# Patient Record
Sex: FEMALE | Race: WHITE | Employment: OTHER | URBAN - METROPOLITAN AREA
[De-identification: names, ages, dates, MRNs, and addresses within clinical notes are randomized per-mention and may not be internally consistent; named-entity substitution may affect disease eponyms.]

---

## 2017-02-14 ENCOUNTER — OFFICE VISIT (OUTPATIENT)
Dept: FAMILY MEDICINE CLINIC | Age: 47
End: 2017-02-14

## 2017-02-14 VITALS
HEART RATE: 83 BPM | WEIGHT: 121.8 LBS | TEMPERATURE: 97.3 F | OXYGEN SATURATION: 98 % | BODY MASS INDEX: 21.58 KG/M2 | RESPIRATION RATE: 18 BRPM | DIASTOLIC BLOOD PRESSURE: 80 MMHG | HEIGHT: 63 IN | SYSTOLIC BLOOD PRESSURE: 124 MMHG

## 2017-02-14 DIAGNOSIS — R10.30 LOWER ABDOMINAL PAIN: Primary | ICD-10-CM

## 2017-02-14 RX ORDER — AMOXICILLIN 875 MG/1
TABLET, FILM COATED ORAL
Refills: 0 | COMMUNITY
Start: 2017-02-11 | End: 2018-06-22 | Stop reason: ALTCHOICE

## 2017-02-14 RX ORDER — CLARITHROMYCIN 500 MG/1
TABLET, FILM COATED, EXTENDED RELEASE ORAL
COMMUNITY
Start: 2017-02-05 | End: 2017-11-07

## 2017-02-14 RX ORDER — VEHICLE BASE NO.24
CREAM (GRAM) MISCELLANEOUS
COMMUNITY
Start: 2017-01-09 | End: 2019-04-17 | Stop reason: ALTCHOICE

## 2017-02-14 RX ORDER — MONTELUKAST SODIUM 10 MG/1
TABLET ORAL
Refills: 1 | COMMUNITY
Start: 2017-02-05 | End: 2019-04-17 | Stop reason: ALTCHOICE

## 2017-02-14 NOTE — PROGRESS NOTES
Chief Complaint   Patient presents with    Sinus Infection     Bisen     Burn     Having burn in [address]     1. Have you been to the ER, urgent care clinic since your last visit? Hospitalized since your last visit? No    2. Have you seen or consulted any other health care providers outside of the [address] [place] [address] since your last visit? Include any pap smears or colon screening.  Yes,  Allergist 12/16

## 2017-02-14 NOTE — MR AVS SNAPSHOT
Visit Information Date & Time Provider Department Dept. Phone Encounter #  
 2/14/2017  2:45 PM Yvette Burrows, [address] [phone] [id number] Your Appointments 2/17/2017  2:30 PM  
New Patient with MD Joan Tsai TURNER, [address] ([address]) Appt Note: new pt   go over medical history [address] [facility] [employer] 7 [id number]  
[phone]  
  
   
 [address] [facility] [address] Upcoming Health Maintenance Date Due DTaP/Tdap/Td series (1 - Tdap) 5/7/1989 PAP AKA CERVICAL CYTOLOGY 4/9/2017 Allergies as of 2/14/2017  Review Complete On: 2/14/2017 By: Yvette Burrows NP No Known Allergies Current Immunizations  Never Reviewed No immunizations on file. Not reviewed this visit Vitals BP Pulse Temp Resp Height(growth percentile) Weight(growth percentile) 124/80 (BP 1 Location: Right arm, BP Patient Position: Sitting) 83 97.3 °F (36.3 °C) (Oral) 18 5' 3\" (1.6 m) 121 lb 12.8 oz (55.2 kg) SpO2 BMI OB Status Smoking Status 98% 21.58 kg/m2 Hysterectomy Never Smoker Vitals History BMI and BSA Data Body Mass Index Body Surface Area  
 21.58 kg/m 2 1.57 m 2 Preferred Pharmacy Pharmacy Name Phone [address] [facility], [address] [place] Your Updated Medication List  
  
   
This list is accurate as of: 2/14/17  3:17 PM.  Always use your most recent med list. ALLEGRA 180 mg tablet Generic drug:  fexofenadine Take  by mouth daily. amoxicillin 875 mg tablet Commonly known as:  AMOXIL  
take 1 tablet by mouth twice a day for 10 days  
  
 citalopram 10 mg tablet Commonly known as:  CeleXA  
take 1 tablet by mouth once daily  
  
 clarithromycin  mg SR tablet Commonly known as:  BIAXIN XL  
  
 FLONASE 50 mcg/actuation nasal spray Generic drug:  fluticasone  
daily. MAGNESIUM PO Take  by mouth.  
  
 montelukast 10 mg tablet Commonly known as:  SINGULAIR  
take 1 tablet by mouth once daily PEPCID AC 10 mg tablet Generic drug:  famotidine Take 10 mg by mouth daily. Mat Negron Generic drug:  [employer] (Bulk) Introducing [facility] SERVICES! Dear Charles Ovalle: Thank you for requesting a [employer] account. Our records indicate that you already have an active [employer] account. You can access your account anytime at [email or web address] Did you know that you can access your hospital and ER discharge instructions at any time in [employer]? You can also review all of your test results from your hospital stay or ER visit. Additional Information If you have questions, please visit the Frequently Asked Questions section of the [employer] website at [email or web address] Remember, [employer] is NOT to be used for urgent needs. For medical emergencies, dial 911. Now available from your iPhone and Android! Please provide this summary of care documentation to your next provider. Your primary care clinician is listed as [address] [address], [facility]/s . If you have any questions after today's visit, please call [phone].

## 2017-02-14 NOTE — PROGRESS NOTES
HISTORY OF PRESENT ILLNESS  Isaiah Mcmillan is a 48 y.o. female. HPI: Pt reports intermittent lower abdominal discomfort with burning for several years. Last month she had sinus infection and went to an urgent care clinic. She was given Biaxin for sinus infection, after taking Biaxin not only her sinus infection got better, her lower abdominal pain also has improved. This made her to believe that she must have had an ulcer that taking Bixain has improved her abdomina pain. She also admits that she is constipated most of the time. She eats eggs for breakfast, for lunch yogurt, peanut butter sandwich and dinner she eats chicken and fish. She doesn't eat much vegetables or fruits. She is worried they will cause abdominal pain. Past Medical History   Diagnosis Date    Allergic rhinitis, seasonal     Irritable bowel syndrome with constipation     Postmenopausal hyperhidrosis      Past Surgical History   Procedure Laterality Date    Hx total abdominal hysterectomy  2002     w/BSPO    Hx colonoscopy  2012     normal    No Known Allergies    Current Outpatient Prescriptions:     MAGNESIUM PO, Take  by mouth., Disp: , Rfl:     fluticasone (FLONASE) 50 mcg/actuation nasal spray, daily. , Disp: , Rfl:     citalopram (CELEXA) 10 mg tablet, take 1 tablet by mouth once daily (Patient taking differently: every fourty-eight (48) hours. take 1 tablet by mouth once daily), Disp: 90 tablet, Rfl: 3    fexofenadine (ALLEGRA) 180 mg tablet, Take  by mouth daily. , Disp: , Rfl:     famotidine (PEPCID AC) 10 mg tablet, Take 10 mg by mouth daily. , Disp: , Rfl:     amoxicillin (AMOXIL) 875 mg tablet, take 1 tablet by mouth twice a day for 10 days, Disp: , Rfl: 0    clarithromycin XL (BIAXIN XL) 500 mg SR tablet, , Disp: , Rfl:     montelukast (SINGULAIR) 10 mg tablet, take 1 tablet by mouth once daily, Disp: , Rfl: 1    VERSABASEA crea, , Disp: , Rfl:   Review of Systems   Constitutional: Negative. Respiratory: Negative. Cardiovascular: Negative. Gastrointestinal: Positive for abdominal pain and constipation. Negative for blood in stool, diarrhea, heartburn, melena, nausea and vomiting. Blood pressure 124/80, pulse 83, temperature 97.3 °F (36.3 °C), temperature source Oral, resp. rate 18, height 5' 3\" (1.6 m), weight 121 lb 12.8 oz (55.2 kg), SpO2 98 %. Physical Exam   Constitutional: No distress. HENT:   Mouth/Throat: Oropharynx is clear and moist.   Neck: Neck supple. Cardiovascular: Normal rate and regular rhythm. No murmur heard. Pulmonary/Chest: Effort normal and breath sounds normal.   Abdominal: Soft. Bowel sounds are normal.   Nursing note and vitals reviewed.       ASSESSMENT and PLAN    ICD-10-CM ICD-9-CM    1. Lower abdominal pain R10.30 789.09 CBC WITH AUTOMATED DIFF      METABOLIC PANEL, COMPREHENSIVE      H. PYLORI ABS, IGG, IGA, IGM      URINALYSIS W/ RFLX MICROSCOPIC   await labs  Advised pt she needs to incorporate vegetables and fruits in her diet to help with her constipation, if she feels bloated she can add  Tamiko to Cinnamon to her drinks or food as they prevent her feeling bloated after eating vegetables  Pt was given an after visit summary which includes diagnosis, current medicines and vital and voiced understanding of treatment plan

## 2017-02-15 LAB
APPEARANCE UR: CLEAR
BILIRUB UR QL STRIP: NEGATIVE
COLOR UR: YELLOW
GLUCOSE UR QL: NEGATIVE
HGB UR QL STRIP: NEGATIVE
KETONES UR QL STRIP: NEGATIVE
LEUKOCYTE ESTERASE UR QL STRIP: NEGATIVE
MICRO URNS: NORMAL
NITRITE UR QL STRIP: NEGATIVE
PH UR STRIP: 6 [PH] (ref 5–7.5)
PROT UR QL STRIP: NEGATIVE
SP GR UR: 1.02 (ref 1–1.03)
UROBILINOGEN UR STRIP-MCNC: 0.2 MG/DL (ref 0.2–1)

## 2017-02-16 LAB
ALBUMIN SERPL-MCNC: 4.8 G/DL (ref 3.5–5.5)
ALBUMIN/GLOB SERPL: 1.9 {RATIO} (ref 1.1–2.5)
ALP SERPL-CCNC: 89 IU/L (ref 39–117)
ALT SERPL-CCNC: 24 IU/L (ref 0–32)
AST SERPL-CCNC: 22 IU/L (ref 0–40)
BASOPHILS # BLD AUTO: 0 X10E3/UL (ref 0–0.2)
BASOPHILS NFR BLD AUTO: 1 %
BILIRUB SERPL-MCNC: 0.5 MG/DL (ref 0–1.2)
BUN SERPL-MCNC: 19 MG/DL (ref 6–24)
BUN/CREAT SERPL: 20 (ref 9–23)
CALCIUM SERPL-MCNC: 9.7 MG/DL (ref 8.7–10.2)
CHLORIDE SERPL-SCNC: 99 MMOL/L (ref 96–106)
CO2 SERPL-SCNC: 25 MMOL/L (ref 18–29)
CREAT SERPL-MCNC: 0.93 MG/DL (ref 0.57–1)
EOSINOPHIL # BLD AUTO: 0.1 X10E3/UL (ref 0–0.4)
EOSINOPHIL NFR BLD AUTO: 1 %
ERYTHROCYTE [DISTWIDTH] IN BLOOD BY AUTOMATED COUNT: 12.4 % (ref 12.3–15.4)
GLOBULIN SER CALC-MCNC: 2.5 G/DL (ref 1.5–4.5)
GLUCOSE SERPL-MCNC: 90 MG/DL (ref 65–99)
H PYLORI IGA SER-ACNC: <9 UNITS (ref 0–8.9)
H PYLORI IGG SER IA-ACNC: <0.9 U/ML (ref 0–0.8)
H PYLORI IGM SER-ACNC: <9 UNITS (ref 0–8.9)
HCT VFR BLD AUTO: 47 % (ref 34–46.6)
HGB BLD-MCNC: 15.9 G/DL (ref 11.1–15.9)
IMM GRANULOCYTES # BLD: 0 X10E3/UL (ref 0–0.1)
IMM GRANULOCYTES NFR BLD: 0 %
LYMPHOCYTES # BLD AUTO: 2.2 X10E3/UL (ref 0.7–3.1)
LYMPHOCYTES NFR BLD AUTO: 40 %
MCH RBC QN AUTO: 31.5 PG (ref 26.6–33)
MCHC RBC AUTO-ENTMCNC: 33.8 G/DL (ref 31.5–35.7)
MCV RBC AUTO: 93 FL (ref 79–97)
MONOCYTES # BLD AUTO: 0.3 X10E3/UL (ref 0.1–0.9)
MONOCYTES NFR BLD AUTO: 6 %
NEUTROPHILS # BLD AUTO: 2.8 X10E3/UL (ref 1.4–7)
NEUTROPHILS NFR BLD AUTO: 52 %
PLATELET # BLD AUTO: 252 X10E3/UL (ref 150–379)
POTASSIUM SERPL-SCNC: 4.5 MMOL/L (ref 3.5–5.2)
PROT SERPL-MCNC: 7.3 G/DL (ref 6–8.5)
RBC # BLD AUTO: 5.04 X10E6/UL (ref 3.77–5.28)
SODIUM SERPL-SCNC: 143 MMOL/L (ref 134–144)
WBC # BLD AUTO: 5.4 X10E3/UL (ref 3.4–10.8)

## 2017-04-18 DIAGNOSIS — F41.9 ANXIETY: ICD-10-CM

## 2017-04-19 RX ORDER — CITALOPRAM 10 MG/1
TABLET, FILM COATED ORAL
Qty: 90 TAB | Refills: 1 | Status: SHIPPED | OUTPATIENT
Start: 2017-04-19 | End: 2017-11-01 | Stop reason: SDUPTHER

## 2017-11-01 DIAGNOSIS — F41.9 ANXIETY: ICD-10-CM

## 2017-11-01 NOTE — TELEPHONE ENCOUNTER
Refill request:   Requested Prescriptions     Pending Prescriptions Disp Refills    CELEXA 10 mg tablet 90 Tab 1           Send script to [facility]    Need brand name-

## 2017-11-03 NOTE — TELEPHONE ENCOUNTER
Let her know she cannot take Celexa with Clarithromycin; is she on this? If yes, cut the dose of celexa to 1/2 pill until done then back to whole pill.

## 2017-11-06 DIAGNOSIS — F41.9 ANXIETY: ICD-10-CM

## 2017-11-06 RX ORDER — CITALOPRAM 10 MG/1
TABLET, FILM COATED ORAL
Qty: 90 TAB | Refills: 0 | Status: SHIPPED | OUTPATIENT
Start: 2017-11-06 | End: 2017-11-06 | Stop reason: SDUPTHER

## 2017-11-06 NOTE — TELEPHONE ENCOUNTER
Bud Sane     -     [phone]               Celexa script from [facility]  states brand only - Patient requesting generic  -

## 2017-11-06 NOTE — TELEPHONE ENCOUNTER
Patient is calling to get the generic brand of the Celexa rx to be called into the pharmacy. Pharmacy on file verified.

## 2017-11-07 RX ORDER — CITALOPRAM 10 MG/1
TABLET ORAL
Qty: 90 TAB | Refills: 0 | Status: SHIPPED | OUTPATIENT
Start: 2017-11-07 | End: 2018-05-20 | Stop reason: SDUPTHER

## 2018-04-11 ENCOUNTER — OFFICE VISIT (OUTPATIENT)
Dept: FAMILY MEDICINE CLINIC | Age: 48
End: 2018-04-11

## 2018-04-11 VITALS
TEMPERATURE: 98.2 F | WEIGHT: 121.8 LBS | OXYGEN SATURATION: 96 % | BODY MASS INDEX: 21.58 KG/M2 | HEIGHT: 63 IN | HEART RATE: 76 BPM | SYSTOLIC BLOOD PRESSURE: 116 MMHG | RESPIRATION RATE: 16 BRPM | DIASTOLIC BLOOD PRESSURE: 76 MMHG

## 2018-04-11 DIAGNOSIS — Z00.00 ROUTINE GENERAL MEDICAL EXAMINATION AT A HEALTH CARE FACILITY: Primary | ICD-10-CM

## 2018-04-11 NOTE — PROGRESS NOTES
Subjective:   50 y.o. female for Well Woman Check. Her gyne and breast care is done elsewhere by her Ob-Gyne physician. Patient Active Problem List    Diagnosis Date Noted    Seasonal allergic rhinitis 12/19/2016    Mitral valve prolapse, + click but no murmur; followed by cardiology-self referred; no SBE recommended- Dr. Charity Delong 04/28/2011    Postmenopausal hyperhidrosis     Irritable bowel syndrome with constipation      Current Outpatient Prescriptions   Medication Sig Dispense Refill    citalopram (CELEXA) 10 mg tablet TAKE 1 TABLET BY MOUTH DAILY 90 Tab 0    MAGNESIUM PO Take  by mouth.  fexofenadine (ALLEGRA) 180 mg tablet Take  by mouth daily.  famotidine (PEPCID AC) 10 mg tablet Take 10 mg by mouth daily.  amoxicillin (AMOXIL) 875 mg tablet take 1 tablet by mouth twice a day for 10 days  0    montelukast (SINGULAIR) 10 mg tablet take 1 tablet by mouth once daily  1    VERSABASEA crea       fluticasone (FLONASE) 50 mcg/actuation nasal spray daily. No Known Allergies  Past Medical History:   Diagnosis Date    Allergic rhinitis, seasonal     Irritable bowel syndrome with constipation     Postmenopausal hyperhidrosis      Past Surgical History:   Procedure Laterality Date    HX COLONOSCOPY  2012    normal     HX TOTAL ABDOMINAL HYSTERECTOMY  2002    w/BSPO     Family History   Problem Relation Age of Onset    Cancer Mother      uterine    Depression Mother     Dementia Mother     Diabetes Maternal Grandfather      Social History   Substance Use Topics    Smoking status: Never Smoker    Smokeless tobacco: Never Used    Alcohol use 1.0 - 1.5 oz/week     2 - 3 Standard drinks or equivalent per week          ROS: Feeling generally well. No TIA's or unusual headaches, no dysphagia. No prolonged cough. No dyspnea or chest pain on exertion. No abdominal pain, change in bowel habits, black or bloody stools. No urinary tract symptoms.   No new or unusual musculoskeletal symptoms. Specific concerns today:None    Objective: The patient appears well, alert, oriented x 3, in no distress. Visit Vitals    /76 (BP 1 Location: Left arm, BP Patient Position: Sitting)    Pulse 76    Temp 98.2 °F (36.8 °C) (Oral)    Resp 16    Ht 5' 3\" (1.6 m)    Wt 121 lb 12.8 oz (55.2 kg)    SpO2 96%    BMI 21.58 kg/m2     ENT normal.  Neck supple. No adenopathy or thyromegaly. LONA. Lungs are clear, good air entry, no wheezes, rhonchi or rales. S1 and S2 normal, no murmurs, regular rate and rhythm. Abdomen soft without tenderness, guarding, mass or organomegaly. Extremities show no edema, normal peripheral pulses. Neurological is normal, no focal findings. Breast and Pelvic exams are deferred. Assessment/Plan:   Well Woman  continue present plan    ICD-10-CM ICD-9-CM    1.  Routine general medical examination at a health care facility Z00.00 V70.0 CBC W/O DIFF      METABOLIC PANEL, COMPREHENSIVE      TSH 3RD GENERATION      LIPID PANEL      VITAMIN D, 25 HYDROXY   Pt was given an after visit summary which includes diagnosis, current medicines and vital and voiced understanding of treatment plan

## 2018-04-12 LAB
25(OH)D3+25(OH)D2 SERPL-MCNC: 53.4 NG/ML (ref 30–100)
ALBUMIN SERPL-MCNC: 4.6 G/DL (ref 3.5–5.5)
ALBUMIN/GLOB SERPL: 1.9 {RATIO} (ref 1.2–2.2)
ALP SERPL-CCNC: 79 IU/L (ref 39–117)
ALT SERPL-CCNC: 24 IU/L (ref 0–32)
AST SERPL-CCNC: 23 IU/L (ref 0–40)
BILIRUB SERPL-MCNC: 0.3 MG/DL (ref 0–1.2)
BUN SERPL-MCNC: 25 MG/DL (ref 6–24)
BUN/CREAT SERPL: 32 (ref 9–23)
CALCIUM SERPL-MCNC: 10 MG/DL (ref 8.7–10.2)
CHLORIDE SERPL-SCNC: 100 MMOL/L (ref 96–106)
CHOLEST SERPL-MCNC: 207 MG/DL (ref 100–199)
CO2 SERPL-SCNC: 27 MMOL/L (ref 18–29)
CREAT SERPL-MCNC: 0.79 MG/DL (ref 0.57–1)
ERYTHROCYTE [DISTWIDTH] IN BLOOD BY AUTOMATED COUNT: 12.3 % (ref 12.3–15.4)
GFR SERPLBLD CREATININE-BSD FMLA CKD-EPI: 102 ML/MIN/1.73
GFR SERPLBLD CREATININE-BSD FMLA CKD-EPI: 88 ML/MIN/1.73
GLOBULIN SER CALC-MCNC: 2.4 G/DL (ref 1.5–4.5)
GLUCOSE SERPL-MCNC: 94 MG/DL (ref 65–99)
HCT VFR BLD AUTO: 44.9 % (ref 34–46.6)
HDLC SERPL-MCNC: 81 MG/DL
HGB BLD-MCNC: 15 G/DL (ref 11.1–15.9)
INTERPRETATION, 910389: NORMAL
LDLC SERPL CALC-MCNC: 103 MG/DL (ref 0–99)
MCH RBC QN AUTO: 31.2 PG (ref 26.6–33)
MCHC RBC AUTO-ENTMCNC: 33.4 G/DL (ref 31.5–35.7)
MCV RBC AUTO: 93 FL (ref 79–97)
PLATELET # BLD AUTO: 229 X10E3/UL (ref 150–379)
POTASSIUM SERPL-SCNC: 4.4 MMOL/L (ref 3.5–5.2)
PROT SERPL-MCNC: 7 G/DL (ref 6–8.5)
RBC # BLD AUTO: 4.81 X10E6/UL (ref 3.77–5.28)
SODIUM SERPL-SCNC: 142 MMOL/L (ref 134–144)
TRIGL SERPL-MCNC: 113 MG/DL (ref 0–149)
TSH SERPL DL<=0.005 MIU/L-ACNC: 1.05 UIU/ML (ref 0.45–4.5)
VLDLC SERPL CALC-MCNC: 23 MG/DL (ref 5–40)
WBC # BLD AUTO: 5.4 X10E3/UL (ref 3.4–10.8)

## 2018-05-20 DIAGNOSIS — F41.9 ANXIETY: ICD-10-CM

## 2018-05-21 RX ORDER — CITALOPRAM 10 MG/1
TABLET ORAL
Qty: 90 TAB | Refills: 3 | Status: SHIPPED | OUTPATIENT
Start: 2018-05-21 | End: 2019-07-02 | Stop reason: SDUPTHER

## 2018-06-22 ENCOUNTER — OFFICE VISIT (OUTPATIENT)
Dept: FAMILY MEDICINE CLINIC | Age: 48
End: 2018-06-22

## 2018-06-22 VITALS
RESPIRATION RATE: 18 BRPM | DIASTOLIC BLOOD PRESSURE: 79 MMHG | HEIGHT: 63 IN | HEART RATE: 62 BPM | SYSTOLIC BLOOD PRESSURE: 115 MMHG | WEIGHT: 121.2 LBS | TEMPERATURE: 97.8 F | OXYGEN SATURATION: 99 % | BODY MASS INDEX: 21.48 KG/M2

## 2018-06-22 DIAGNOSIS — J06.9 ACUTE URI: Primary | ICD-10-CM

## 2018-06-22 RX ORDER — AZITHROMYCIN 250 MG/1
TABLET, FILM COATED ORAL
Qty: 6 TAB | Refills: 0 | Status: SHIPPED | OUTPATIENT
Start: 2018-06-22 | End: 2018-06-27

## 2018-06-22 NOTE — PROGRESS NOTES
HISTORY OF PRESENT ILLNESS  Donya Burton is a 46 y.o. female. HPI: Patient complaints of nasal congestion, yellow nasal discharge, facial pain and ears popping x five days. Taking motrin and zyrtec without help. She is going out of town and would like to feel better before leaving  Past Medical History:   Diagnosis Date    Allergic rhinitis, seasonal     Irritable bowel syndrome with constipation     Postmenopausal hyperhidrosis      Past Surgical History:   Procedure Laterality Date    HX COLONOSCOPY  2012    normal     HX TOTAL ABDOMINAL HYSTERECTOMY  2002    w/BSPO     Allergies   Allergen Reactions    Penicillins Rash     Current Outpatient Prescriptions:     azithromycin (ZITHROMAX) 250 mg tablet, Take 2 tablets today, then take 1 tablet daily, Disp: 6 Tab, Rfl: 0    citalopram (CELEXA) 10 mg tablet, take 1 tablet by mouth once daily, Disp: 90 Tab, Rfl: 3    MAGNESIUM PO, Take 400 mg by mouth daily. , Disp: , Rfl:     fexofenadine (ALLEGRA) 180 mg tablet, Take  by mouth daily. , Disp: , Rfl:     famotidine (PEPCID AC) 10 mg tablet, Take 10 mg by mouth daily. , Disp: , Rfl:     montelukast (SINGULAIR) 10 mg tablet, take 1 tablet by mouth once daily, Disp: , Rfl: 1    VERSABASEA crea, , Disp: , Rfl:     fluticasone (FLONASE) 50 mcg/actuation nasal spray, daily. , Disp: , Rfl:   Review of Systems   Constitutional: Negative. HENT: Positive for congestion, sinus pain and sore throat. Respiratory: Negative. Cardiovascular: Negative. Gastrointestinal: Negative. Blood pressure 115/79, pulse 62, temperature 97.8 °F (36.6 °C), temperature source Oral, resp. rate 18, height 5' 3\" (1.6 m), weight 121 lb 3.2 oz (55 kg), SpO2 99 %. Physical Exam   Constitutional: No distress. HENT:   Nasopharyngeal erythema   Maxillary sinus pain   Neck: Neck supple. Cardiovascular: Normal rate and regular rhythm. No murmur heard.   Pulmonary/Chest: Effort normal and breath sounds normal.   Abdominal: Soft. Bowel sounds are normal.   Nursing note and vitals reviewed. ASSESSMENT and PLAN  Diagnoses and all orders for this visit:    1. Acute URI  -     azithromycin (ZITHROMAX) 250 mg tablet;  Take 2 tablets today, then take 1 tablet daily        -     continue with motrin and zyrtec   Pt was given an after visit summary which includes diagnosis, current medicines and vital and voiced understanding of treatment plan

## 2018-06-22 NOTE — MR AVS SNAPSHOT
Irlanda Jensen 
 
 
 [address] 
[phone] Patient: Kaitlin Kumar MRN: [id number] OSQ:[birth year] Visit Information Date & Time Provider Department Dept. Phone Encounter #  
 6/22/2018 11:45 AM Natasha Ortiz, [facility] [phone] [id number] Upcoming Health Maintenance Date Due DTaP/Tdap/Td series (1 - Tdap) 5/7/1989 FOBT Q 1 YEAR AGE 50-75 5/7/2018 Influenza Age 5 to Adult 8/1/2018 PAP AKA CERVICAL CYTOLOGY 6/1/2020 BREAST CANCER SCRN MAMMOGRAM 6/5/2020 Allergies as of 6/22/2018  Review Complete On: 6/22/2018 By: Natasha Ortiz NP Severity Noted Reaction Type Reactions Penicillins High 06/22/2018    Rash Current Immunizations  Never Reviewed No immunizations on file. Not reviewed this visit You Were Diagnosed With   
  
 Codes Comments Acute URI    -  Primary ICD-10-CM: J06.9 ICD-9-CM: 465.9 Vitals BP Pulse Temp Resp Height(growth percentile) Weight(growth percentile) 115/79 (BP 1 Location: Left arm, BP Patient Position: Sitting) 62 97.8 °F (36.6 °C) (Oral) 18 5' 3\" (1.6 m) 121 lb 3.2 oz (55 kg) SpO2 BMI OB Status Smoking Status 99% 21.47 kg/m2 Hysterectomy Never Smoker Vitals History BMI and BSA Data Body Mass Index Body Surface Area  
 21.47 kg/m 2 1.56 m 2 Preferred Pharmacy Pharmacy Name Phone [address] Your Updated Medication List  
  
   
This list is accurate as of 6/22/18 12:24 PM.  Always use your most recent med list. ALLEGRA 180 mg tablet Generic drug:  fexofenadine Take  by mouth daily. azithromycin 250 mg tablet Commonly known as:  Alfa Weld Take 2 tablets today, then take 1 tablet daily  
  
 citalopram 10 mg tablet Commonly known as:  CELEXA  
take 1 tablet by mouth once daily FLONASE 50 mcg/actuation nasal spray Generic drug:  fluticasone  
daily. MAGNESIUM PO Take 400 mg by mouth daily. montelukast 10 mg tablet Commonly known as:  SINGULAIR  
take 1 tablet by mouth once daily PEPCID AC 10 mg tablet Generic drug:  famotidine Take 10 mg by mouth daily. Hui Sickle Generic drug:  [employer] (Bulk) Prescriptions Sent to Pharmacy Refills  
 azithromycin (ZITHROMAX) 250 mg tablet 0 Sig: Take 2 tablets today, then take 1 tablet daily Class: Normal  
 Pharmacy: [id number] [facility], [address] [place] [address] #: [phone] [facility] SERVICES! Dear Gildardo Arzola: Thank you for requesting a [employer] account. Our records indicate that you already have an active [employer]ongo account. You can access your account anytime at [email or web address] Did you know that you can access your hospital and ER discharge instructions at any time in Zipongo? You can also review all of your test results from your hospital stay or ER visit. Additional Information If you have questions, please visit the Frequently Asked Questions section of the [employer] website at [email or web address] Remember, Zipongo is NOT to be used for urgent needs. For medical emergencies, dial 911. Now available from your iPhone and Android! Please provide this summary of care documentation to your next provider. Your primary care clinician is listed as [address] [address], [facility]/s . If you have any questions after today's visit, please call [phone].

## 2018-06-22 NOTE — PROGRESS NOTES
Chief Complaint   Patient presents with    Sinus Infection     sore throat, pressure around eyes x 5 days     1. Have you been to the ER, urgent care clinic since your last visit? Hospitalized since your last visit? No    2. Have you seen or consulted any other health care providers outside of the [address] [place] [address] since your last visit? Include any pap smears or colon screening.  No

## 2019-04-16 ENCOUNTER — OFFICE VISIT (OUTPATIENT)
Dept: FAMILY MEDICINE CLINIC | Age: 49
End: 2019-04-16

## 2019-04-16 DIAGNOSIS — Z12.11 COLON CANCER SCREENING: ICD-10-CM

## 2019-04-16 DIAGNOSIS — Z23 NEED FOR TETANUS, DIPHTHERIA, AND ACELLULAR PERTUSSIS (TDAP) VACCINE: ICD-10-CM

## 2019-04-16 DIAGNOSIS — J30.2 SEASONAL ALLERGIC RHINITIS, UNSPECIFIED TRIGGER: ICD-10-CM

## 2019-04-16 DIAGNOSIS — Z83.3 FAMILY HISTORY OF DIABETES MELLITUS: ICD-10-CM

## 2019-04-16 DIAGNOSIS — F41.9 ANXIETY: ICD-10-CM

## 2019-04-16 DIAGNOSIS — Z00.00 PHYSICAL EXAM: Primary | ICD-10-CM

## 2019-04-16 DIAGNOSIS — I34.1 MITRAL VALVE PROLAPSE: ICD-10-CM

## 2019-04-16 DIAGNOSIS — E55.9 VITAMIN D DEFICIENCY: ICD-10-CM

## 2019-04-16 DIAGNOSIS — Z82.62 FAMILY HISTORY OF OSTEOPOROSIS IN MOTHER: ICD-10-CM

## 2019-04-16 NOTE — PROGRESS NOTES
Chief Complaint   Patient presents with    Complete Physical     1. Have you been to the ER, urgent care clinic since your last visit? Hospitalized since your last visit? No    2. Have you seen or consulted any other health care providers outside of the [address] [place] [address] since your last visit? Include any pap smears or colon screening.  No

## 2019-04-17 VITALS
RESPIRATION RATE: 18 BRPM | TEMPERATURE: 97.8 F | HEIGHT: 63 IN | WEIGHT: 119.6 LBS | SYSTOLIC BLOOD PRESSURE: 105 MMHG | BODY MASS INDEX: 21.19 KG/M2 | OXYGEN SATURATION: 97 % | HEART RATE: 73 BPM | DIASTOLIC BLOOD PRESSURE: 73 MMHG

## 2019-04-17 LAB
25(OH)D3+25(OH)D2 SERPL-MCNC: 59 NG/ML (ref 30–100)
ALBUMIN SERPL-MCNC: 4.6 G/DL (ref 3.5–5.5)
ALBUMIN/GLOB SERPL: 2 {RATIO} (ref 1.2–2.2)
ALP SERPL-CCNC: 77 IU/L (ref 39–117)
ALT SERPL-CCNC: 25 IU/L (ref 0–32)
APPEARANCE UR: CLEAR
AST SERPL-CCNC: 21 IU/L (ref 0–40)
BILIRUB SERPL-MCNC: 0.3 MG/DL (ref 0–1.2)
BILIRUB UR QL STRIP: NEGATIVE
BUN SERPL-MCNC: 28 MG/DL (ref 6–24)
BUN/CREAT SERPL: 30 (ref 9–23)
CALCIUM SERPL-MCNC: 10 MG/DL (ref 8.7–10.2)
CHLORIDE SERPL-SCNC: 101 MMOL/L (ref 96–106)
CHOLEST SERPL-MCNC: 215 MG/DL (ref 100–199)
CO2 SERPL-SCNC: 26 MMOL/L (ref 20–29)
COLOR UR: YELLOW
CREAT SERPL-MCNC: 0.92 MG/DL (ref 0.57–1)
ERYTHROCYTE [DISTWIDTH] IN BLOOD BY AUTOMATED COUNT: 12.7 % (ref 12.3–15.4)
GLOBULIN SER CALC-MCNC: 2.3 G/DL (ref 1.5–4.5)
GLUCOSE SERPL-MCNC: 102 MG/DL (ref 65–99)
GLUCOSE UR QL: NEGATIVE
HCT VFR BLD AUTO: 46.7 % (ref 34–46.6)
HDLC SERPL-MCNC: 78 MG/DL
HGB BLD-MCNC: 15.3 G/DL (ref 11.1–15.9)
HGB UR QL STRIP: NEGATIVE
INTERPRETATION, 910389: NORMAL
KETONES UR QL STRIP: NEGATIVE
LDLC SERPL CALC-MCNC: 123 MG/DL (ref 0–99)
LEUKOCYTE ESTERASE UR QL STRIP: NEGATIVE
MCH RBC QN AUTO: 31 PG (ref 26.6–33)
MCHC RBC AUTO-ENTMCNC: 32.8 G/DL (ref 31.5–35.7)
MCV RBC AUTO: 95 FL (ref 79–97)
MICRO URNS: NORMAL
NITRITE UR QL STRIP: NEGATIVE
PH UR STRIP: 6 [PH] (ref 5–7.5)
PLATELET # BLD AUTO: 221 X10E3/UL (ref 150–379)
POTASSIUM SERPL-SCNC: 3.8 MMOL/L (ref 3.5–5.2)
PROT SERPL-MCNC: 6.9 G/DL (ref 6–8.5)
PROT UR QL STRIP: NEGATIVE
RBC # BLD AUTO: 4.93 X10E6/UL (ref 3.77–5.28)
SODIUM SERPL-SCNC: 141 MMOL/L (ref 134–144)
SP GR UR: 1.01 (ref 1–1.03)
TRIGL SERPL-MCNC: 72 MG/DL (ref 0–149)
UROBILINOGEN UR STRIP-MCNC: 0.2 MG/DL (ref 0.2–1)
VLDLC SERPL CALC-MCNC: 14 MG/DL (ref 5–40)
WBC # BLD AUTO: 5.5 X10E3/UL (ref 3.4–10.8)

## 2019-07-02 DIAGNOSIS — F41.9 ANXIETY: ICD-10-CM

## 2019-07-03 RX ORDER — CITALOPRAM 10 MG/1
TABLET ORAL
Qty: 30 TAB | Refills: 10 | Status: SHIPPED | OUTPATIENT
Start: 2019-07-03 | End: 2019-08-04 | Stop reason: SDUPTHER

## 2019-08-04 DIAGNOSIS — F41.9 ANXIETY: ICD-10-CM

## 2019-08-05 RX ORDER — CITALOPRAM 10 MG/1
TABLET ORAL
Qty: 30 TAB | Refills: 0 | Status: SHIPPED | OUTPATIENT
Start: 2019-08-05 | End: 2019-08-05 | Stop reason: SDUPTHER

## 2019-08-05 RX ORDER — CITALOPRAM 10 MG/1
TABLET ORAL
Qty: 30 TAB | Refills: 3 | Status: SHIPPED | OUTPATIENT
Start: 2019-08-05 | End: 2020-03-09

## 2020-03-09 DIAGNOSIS — F41.9 ANXIETY: ICD-10-CM

## 2020-03-09 RX ORDER — CITALOPRAM 10 MG/1
TABLET ORAL
Qty: 30 TAB | Refills: 1 | Status: SHIPPED | OUTPATIENT
Start: 2020-03-09 | End: 2020-05-11

## 2020-03-09 NOTE — TELEPHONE ENCOUNTER
PCP: Augustine Marsh MD    Last appt: 4/16/2019  No future appointments.     Requested Prescriptions     Pending Prescriptions Disp Refills    citalopram (CELEXA) 10 mg tablet [Pharmacy Med Name: CITALOPRAM 10MG TABLETS] 30 Tab 3     Sig: TAKE 1 TABLET BY MOUTH EVERY DAY

## 2020-08-19 DIAGNOSIS — F41.9 ANXIETY: ICD-10-CM

## 2020-08-19 NOTE — TELEPHONE ENCOUNTER
Overdue for yearly visit. Needs to schedule an  office visit. Once visit is scheduled we can refill medication until appointment.

## 2020-08-20 RX ORDER — CITALOPRAM 10 MG/1
TABLET ORAL
Qty: 30 TAB | Refills: 2 | OUTPATIENT
Start: 2020-08-20

## 2021-12-08 ENCOUNTER — TRANSCRIBE ORDER (OUTPATIENT)
Dept: SCHEDULING | Age: 51
End: 2021-12-08

## 2021-12-08 DIAGNOSIS — Z13.820 ENCOUNTER FOR SCREENING FOR OSTEOPOROSIS: Primary | ICD-10-CM

## 2021-12-13 ENCOUNTER — HOSPITAL ENCOUNTER (OUTPATIENT)
Dept: BONE DENSITY | Age: 51
Discharge: HOME | End: 2021-12-13
Attending: OBSTETRICS & GYNECOLOGY
Payer: COMMERCIAL

## 2021-12-13 DIAGNOSIS — Z13.820 ENCOUNTER FOR SCREENING FOR OSTEOPOROSIS: ICD-10-CM

## 2021-12-13 PROCEDURE — 77080 DXA BONE DENSITY AXIAL: CPT

## 2023-05-24 RX ORDER — FEXOFENADINE HCL 180 MG/1
TABLET ORAL DAILY
COMMUNITY

## 2023-05-24 RX ORDER — FLUTICASONE PROPIONATE 50 MCG
SPRAY, SUSPENSION (ML) NASAL DAILY
COMMUNITY

## 2023-05-24 RX ORDER — CITALOPRAM HYDROBROMIDE 10 MG/1
1 TABLET ORAL DAILY
COMMUNITY
Start: 2020-05-11

## 2023-05-24 RX ORDER — FAMOTIDINE 10 MG
10 TABLET ORAL DAILY
COMMUNITY
Start: 2011-04-27

## 2024-06-17 ENCOUNTER — HOSPITAL ENCOUNTER (OUTPATIENT)
Age: 54
Discharge: HOME | End: 2024-06-20
Payer: COMMERCIAL

## 2024-06-17 DIAGNOSIS — H68.122 INTRINSIC CARTILAGENOUS OBSTRUCTION OF EUSTACHIAN TUBE, LEFT EAR: ICD-10-CM

## 2024-06-17 DIAGNOSIS — J01.01 RECURRENT MAXILLARY SINUSITIS: ICD-10-CM

## 2024-06-17 PROCEDURE — 70486 CT MAXILLOFACIAL W/O DYE: CPT
